# Patient Record
Sex: MALE | Race: WHITE | NOT HISPANIC OR LATINO | Employment: OTHER | URBAN - NONMETROPOLITAN AREA
[De-identification: names, ages, dates, MRNs, and addresses within clinical notes are randomized per-mention and may not be internally consistent; named-entity substitution may affect disease eponyms.]

---

## 2024-01-20 ENCOUNTER — HOSPITAL ENCOUNTER (OUTPATIENT)
Facility: HOSPITAL | Age: 59
Setting detail: OBSERVATION
Discharge: HOME | DRG: 310 | End: 2024-01-21
Attending: EMERGENCY MEDICINE | Admitting: HOSPITALIST
Payer: COMMERCIAL

## 2024-01-20 ENCOUNTER — HOSPITAL ENCOUNTER (OUTPATIENT)
Dept: CARDIOLOGY | Facility: HOSPITAL | Age: 59
Discharge: HOME | End: 2024-01-20
Payer: COMMERCIAL

## 2024-01-20 ENCOUNTER — APPOINTMENT (OUTPATIENT)
Dept: RADIOLOGY | Facility: HOSPITAL | Age: 59
DRG: 310 | End: 2024-01-20
Payer: COMMERCIAL

## 2024-01-20 DIAGNOSIS — I20.0 UNSTABLE ANGINA PECTORIS (MULTI): ICD-10-CM

## 2024-01-20 DIAGNOSIS — I48.91 ATRIAL FIBRILLATION WITH RVR (MULTI): Primary | ICD-10-CM

## 2024-01-20 LAB
ALBUMIN SERPL BCP-MCNC: 4.3 G/DL (ref 3.4–5)
ALP SERPL-CCNC: 69 U/L (ref 33–120)
ALT SERPL W P-5'-P-CCNC: 14 U/L (ref 10–52)
ANION GAP SERPL CALC-SCNC: 11 MMOL/L (ref 10–20)
APTT PPP: 32 SECONDS (ref 27–38)
AST SERPL W P-5'-P-CCNC: 12 U/L (ref 9–39)
BASOPHILS # BLD AUTO: 0.13 X10*3/UL (ref 0–0.1)
BASOPHILS NFR BLD AUTO: 1.2 %
BILIRUB DIRECT SERPL-MCNC: 0.1 MG/DL (ref 0–0.3)
BILIRUB SERPL-MCNC: 0.3 MG/DL (ref 0–1.2)
BNP SERPL-MCNC: 44 PG/ML (ref 0–99)
BUN SERPL-MCNC: 23 MG/DL (ref 6–23)
CALCIUM SERPL-MCNC: 9.5 MG/DL (ref 8.6–10.3)
CARDIAC TROPONIN I PNL SERPL HS: 5 NG/L (ref 0–20)
CHLORIDE SERPL-SCNC: 106 MMOL/L (ref 98–107)
CO2 SERPL-SCNC: 26 MMOL/L (ref 21–32)
CREAT SERPL-MCNC: 0.99 MG/DL (ref 0.5–1.3)
EGFRCR SERPLBLD CKD-EPI 2021: 88 ML/MIN/1.73M*2
EOSINOPHIL # BLD AUTO: 0.37 X10*3/UL (ref 0–0.7)
EOSINOPHIL NFR BLD AUTO: 3.4 %
ERYTHROCYTE [DISTWIDTH] IN BLOOD BY AUTOMATED COUNT: 12.2 % (ref 11.5–14.5)
FLUAV RNA RESP QL NAA+PROBE: NOT DETECTED
FLUBV RNA RESP QL NAA+PROBE: NOT DETECTED
GLUCOSE BLD MANUAL STRIP-MCNC: 111 MG/DL (ref 74–99)
GLUCOSE SERPL-MCNC: 140 MG/DL (ref 74–99)
HCT VFR BLD AUTO: 43.8 % (ref 41–52)
HGB BLD-MCNC: 14.7 G/DL (ref 13.5–17.5)
HOLD SPECIMEN: NORMAL
IMM GRANULOCYTES # BLD AUTO: 0.06 X10*3/UL (ref 0–0.7)
IMM GRANULOCYTES NFR BLD AUTO: 0.5 % (ref 0–0.9)
INR PPP: 1 (ref 0.9–1.1)
LIPASE SERPL-CCNC: 14 U/L (ref 9–82)
LYMPHOCYTES # BLD AUTO: 3.24 X10*3/UL (ref 1.2–4.8)
LYMPHOCYTES NFR BLD AUTO: 29.7 %
MAGNESIUM SERPL-MCNC: 2.28 MG/DL (ref 1.6–2.4)
MCH RBC QN AUTO: 31.4 PG (ref 26–34)
MCHC RBC AUTO-ENTMCNC: 33.6 G/DL (ref 32–36)
MCV RBC AUTO: 94 FL (ref 80–100)
MONOCYTES # BLD AUTO: 0.7 X10*3/UL (ref 0.1–1)
MONOCYTES NFR BLD AUTO: 6.4 %
NEUTROPHILS # BLD AUTO: 6.41 X10*3/UL (ref 1.2–7.7)
NEUTROPHILS NFR BLD AUTO: 58.8 %
NRBC BLD-RTO: 0 /100 WBCS (ref 0–0)
PLATELET # BLD AUTO: 242 X10*3/UL (ref 150–450)
POTASSIUM SERPL-SCNC: 4 MMOL/L (ref 3.5–5.3)
PROT SERPL-MCNC: 6.7 G/DL (ref 6.4–8.2)
PROTHROMBIN TIME: 11.8 SECONDS (ref 9.8–12.8)
RBC # BLD AUTO: 4.68 X10*6/UL (ref 4.5–5.9)
SARS-COV-2 RNA RESP QL NAA+PROBE: NOT DETECTED
SODIUM SERPL-SCNC: 139 MMOL/L (ref 136–145)
WBC # BLD AUTO: 10.9 X10*3/UL (ref 4.4–11.3)

## 2024-01-20 PROCEDURE — 71045 X-RAY EXAM CHEST 1 VIEW: CPT

## 2024-01-20 PROCEDURE — 85730 THROMBOPLASTIN TIME PARTIAL: CPT | Performed by: EMERGENCY MEDICINE

## 2024-01-20 PROCEDURE — 93005 ELECTROCARDIOGRAM TRACING: CPT

## 2024-01-20 PROCEDURE — 93010 ELECTROCARDIOGRAM REPORT: CPT | Performed by: INTERNAL MEDICINE

## 2024-01-20 PROCEDURE — 83690 ASSAY OF LIPASE: CPT | Performed by: EMERGENCY MEDICINE

## 2024-01-20 PROCEDURE — 82248 BILIRUBIN DIRECT: CPT | Performed by: EMERGENCY MEDICINE

## 2024-01-20 PROCEDURE — 2500000005 HC RX 250 GENERAL PHARMACY W/O HCPCS: Performed by: EMERGENCY MEDICINE

## 2024-01-20 PROCEDURE — 2500000004 HC RX 250 GENERAL PHARMACY W/ HCPCS (ALT 636 FOR OP/ED): Performed by: INTERNAL MEDICINE

## 2024-01-20 PROCEDURE — 80048 BASIC METABOLIC PNL TOTAL CA: CPT | Performed by: EMERGENCY MEDICINE

## 2024-01-20 PROCEDURE — 85025 COMPLETE CBC W/AUTO DIFF WBC: CPT | Performed by: EMERGENCY MEDICINE

## 2024-01-20 PROCEDURE — G0378 HOSPITAL OBSERVATION PER HR: HCPCS

## 2024-01-20 PROCEDURE — 84484 ASSAY OF TROPONIN QUANT: CPT | Performed by: EMERGENCY MEDICINE

## 2024-01-20 PROCEDURE — 2500000004 HC RX 250 GENERAL PHARMACY W/ HCPCS (ALT 636 FOR OP/ED): Performed by: EMERGENCY MEDICINE

## 2024-01-20 PROCEDURE — 85610 PROTHROMBIN TIME: CPT | Performed by: EMERGENCY MEDICINE

## 2024-01-20 PROCEDURE — 82947 ASSAY GLUCOSE BLOOD QUANT: CPT

## 2024-01-20 PROCEDURE — 96365 THER/PROPH/DIAG IV INF INIT: CPT

## 2024-01-20 PROCEDURE — 83735 ASSAY OF MAGNESIUM: CPT | Performed by: EMERGENCY MEDICINE

## 2024-01-20 PROCEDURE — 2020000001 HC ICU ROOM DAILY

## 2024-01-20 PROCEDURE — 87636 SARSCOV2 & INF A&B AMP PRB: CPT | Performed by: EMERGENCY MEDICINE

## 2024-01-20 PROCEDURE — 96366 THER/PROPH/DIAG IV INF ADDON: CPT

## 2024-01-20 PROCEDURE — 99291 CRITICAL CARE FIRST HOUR: CPT | Performed by: INTERNAL MEDICINE

## 2024-01-20 PROCEDURE — 99285 EMERGENCY DEPT VISIT HI MDM: CPT | Mod: 25 | Performed by: EMERGENCY MEDICINE

## 2024-01-20 PROCEDURE — 96374 THER/PROPH/DIAG INJ IV PUSH: CPT

## 2024-01-20 PROCEDURE — 36415 COLL VENOUS BLD VENIPUNCTURE: CPT | Performed by: EMERGENCY MEDICINE

## 2024-01-20 PROCEDURE — 71045 X-RAY EXAM CHEST 1 VIEW: CPT | Performed by: RADIOLOGY

## 2024-01-20 PROCEDURE — 83880 ASSAY OF NATRIURETIC PEPTIDE: CPT | Performed by: EMERGENCY MEDICINE

## 2024-01-20 PROCEDURE — 84443 ASSAY THYROID STIM HORMONE: CPT | Performed by: INTERNAL MEDICINE

## 2024-01-20 RX ORDER — ISOSORBIDE MONONITRATE 60 MG/1
60 TABLET, EXTENDED RELEASE ORAL DAILY
COMMUNITY
Start: 2023-02-21 | End: 2024-02-21

## 2024-01-20 RX ORDER — SODIUM CHLORIDE 9 MG/ML
125 INJECTION, SOLUTION INTRAVENOUS CONTINUOUS
Status: DISCONTINUED | OUTPATIENT
Start: 2024-01-20 | End: 2024-01-21

## 2024-01-20 RX ORDER — BUPROPION HYDROCHLORIDE 150 MG/1
150 TABLET ORAL EVERY MORNING
COMMUNITY
Start: 2022-02-01

## 2024-01-20 RX ORDER — METOPROLOL SUCCINATE 50 MG/1
50 TABLET, EXTENDED RELEASE ORAL DAILY
Status: ON HOLD | COMMUNITY
Start: 2022-09-06 | End: 2024-01-21 | Stop reason: SDUPTHER

## 2024-01-20 RX ORDER — DILTIAZEM HYDROCHLORIDE 5 MG/ML
INJECTION INTRAVENOUS
Status: COMPLETED
Start: 2024-01-20 | End: 2024-01-20

## 2024-01-20 RX ORDER — DILTIAZEM HCL/D5W 125 MG/125
15 PLASTIC BAG, INJECTION (ML) INTRAVENOUS CONTINUOUS
Status: DISCONTINUED | OUTPATIENT
Start: 2024-01-20 | End: 2024-01-21 | Stop reason: HOSPADM

## 2024-01-20 RX ORDER — ENALAPRIL MALEATE 5 MG/1
5 TABLET ORAL DAILY
COMMUNITY
Start: 2022-12-22

## 2024-01-20 RX ORDER — DILTIAZEM HYDROCHLORIDE 5 MG/ML
20 INJECTION INTRAVENOUS ONCE
Status: COMPLETED | OUTPATIENT
Start: 2024-01-20 | End: 2024-01-20

## 2024-01-20 RX ORDER — GABAPENTIN 300 MG/1
300 CAPSULE ORAL 3 TIMES DAILY
COMMUNITY
Start: 2022-12-22

## 2024-01-20 RX ORDER — DILTIAZEM HYDROCHLORIDE 5 MG/ML
10 INJECTION INTRAVENOUS ONCE
Status: COMPLETED | OUTPATIENT
Start: 2024-01-20 | End: 2024-01-20

## 2024-01-20 RX ORDER — ADENOSINE 3 MG/ML
6 INJECTION, SOLUTION INTRAVENOUS ONCE
Status: COMPLETED | OUTPATIENT
Start: 2024-01-20 | End: 2024-01-20

## 2024-01-20 RX ORDER — NITROGLYCERIN 0.4 MG/1
0.4 TABLET SUBLINGUAL EVERY 5 MIN PRN
Status: ON HOLD | COMMUNITY
Start: 2023-02-21 | End: 2024-01-21 | Stop reason: SDUPTHER

## 2024-01-20 RX ADMIN — DILTIAZEM HCL 125 MG/125ML IN DEXTROSE 5% IV SOLN (1 MG/ML) 15 MG/HR: 125-5/125 SOLUTION at 17:58

## 2024-01-20 RX ADMIN — DILTIAZEM HYDROCHLORIDE 20 MG: 5 INJECTION INTRAVENOUS at 17:54

## 2024-01-20 RX ADMIN — DILTIAZEM HYDROCHLORIDE 10 MG: 5 INJECTION INTRAVENOUS at 18:01

## 2024-01-20 RX ADMIN — ADENOSINE 6 MG: 3 INJECTION, SOLUTION INTRAVENOUS at 17:49

## 2024-01-20 RX ADMIN — SODIUM CHLORIDE 125 ML/HR: 9 INJECTION, SOLUTION INTRAVENOUS at 17:52

## 2024-01-20 RX ADMIN — Medication 2 L/MIN: at 18:10

## 2024-01-20 SDOH — SOCIAL STABILITY: SOCIAL INSECURITY: HAVE YOU HAD THOUGHTS OF HARMING ANYONE ELSE?: NO

## 2024-01-20 SDOH — SOCIAL STABILITY: SOCIAL INSECURITY: DO YOU FEEL ANYONE HAS EXPLOITED OR TAKEN ADVANTAGE OF YOU FINANCIALLY OR OF YOUR PERSONAL PROPERTY?: NO

## 2024-01-20 SDOH — SOCIAL STABILITY: SOCIAL INSECURITY: DO YOU FEEL UNSAFE GOING BACK TO THE PLACE WHERE YOU ARE LIVING?: NO

## 2024-01-20 SDOH — SOCIAL STABILITY: SOCIAL INSECURITY: ARE YOU OR HAVE YOU BEEN THREATENED OR ABUSED PHYSICALLY, EMOTIONALLY, OR SEXUALLY BY ANYONE?: NO

## 2024-01-20 SDOH — SOCIAL STABILITY: SOCIAL INSECURITY: WERE YOU ABLE TO COMPLETE ALL THE BEHAVIORAL HEALTH SCREENINGS?: YES

## 2024-01-20 SDOH — SOCIAL STABILITY: SOCIAL INSECURITY: ABUSE: ADULT

## 2024-01-20 SDOH — SOCIAL STABILITY: SOCIAL INSECURITY: ARE THERE ANY APPARENT SIGNS OF INJURIES/BEHAVIORS THAT COULD BE RELATED TO ABUSE/NEGLECT?: NO

## 2024-01-20 SDOH — SOCIAL STABILITY: SOCIAL INSECURITY: HAS ANYONE EVER THREATENED TO HURT YOUR FAMILY OR YOUR PETS?: NO

## 2024-01-20 SDOH — SOCIAL STABILITY: SOCIAL INSECURITY: DOES ANYONE TRY TO KEEP YOU FROM HAVING/CONTACTING OTHER FRIENDS OR DOING THINGS OUTSIDE YOUR HOME?: NO

## 2024-01-20 ASSESSMENT — PAIN DESCRIPTION - DESCRIPTORS
DESCRIPTORS: SHARP;PRESSURE
DESCRIPTORS: DISCOMFORT

## 2024-01-20 ASSESSMENT — COGNITIVE AND FUNCTIONAL STATUS - GENERAL
MOBILITY SCORE: 24
DAILY ACTIVITIY SCORE: 24
PATIENT BASELINE BEDBOUND: NO

## 2024-01-20 ASSESSMENT — LIFESTYLE VARIABLES
PRESCIPTION_ABUSE_PAST_12_MONTHS: NO
SUBSTANCE_ABUSE_PAST_12_MONTHS: YES
AUDIT-C TOTAL SCORE: -1
HOW MANY STANDARD DRINKS CONTAINING ALCOHOL DO YOU HAVE ON A TYPICAL DAY: PATIENT DECLINED
HOW OFTEN DO YOU HAVE A DRINK CONTAINING ALCOHOL: PATIENT DECLINED
HOW OFTEN DO YOU HAVE 6 OR MORE DRINKS ON ONE OCCASION: PATIENT DECLINED
SKIP TO QUESTIONS 9-10: 0
AUDIT-C TOTAL SCORE: -1

## 2024-01-20 ASSESSMENT — ENCOUNTER SYMPTOMS
NAUSEA: 0
ABDOMINAL PAIN: 0
MYALGIAS: 0
ARTHRALGIAS: 0
DIZZINESS: 1
EYES NEGATIVE: 1
FEVER: 0
CHILLS: 0
NECK STIFFNESS: 0
FLANK PAIN: 0
LIGHT-HEADEDNESS: 1
PSYCHIATRIC NEGATIVE: 1
NECK PAIN: 0
CHEST TIGHTNESS: 1
PALPITATIONS: 1
VOMITING: 0
HEMATOLOGIC/LYMPHATIC NEGATIVE: 1

## 2024-01-20 ASSESSMENT — ACTIVITIES OF DAILY LIVING (ADL)
BATHING: INDEPENDENT
LACK_OF_TRANSPORTATION: NO
TOILETING: INDEPENDENT
HEARING - LEFT EAR: HEARING AID
WALKS IN HOME: INDEPENDENT
DRESSING YOURSELF: INDEPENDENT
HEARING - RIGHT EAR: HEARING AID
ADEQUATE_TO_COMPLETE_ADL: YES
JUDGMENT_ADEQUATE_SAFELY_COMPLETE_DAILY_ACTIVITIES: YES
LACK_OF_TRANSPORTATION: NO
FEEDING YOURSELF: INDEPENDENT
PATIENT'S MEMORY ADEQUATE TO SAFELY COMPLETE DAILY ACTIVITIES?: YES
GROOMING: INDEPENDENT

## 2024-01-20 ASSESSMENT — PATIENT HEALTH QUESTIONNAIRE - PHQ9
1. LITTLE INTEREST OR PLEASURE IN DOING THINGS: NOT AT ALL
SUM OF ALL RESPONSES TO PHQ9 QUESTIONS 1 & 2: 0
2. FEELING DOWN, DEPRESSED OR HOPELESS: NOT AT ALL

## 2024-01-20 ASSESSMENT — PAIN DESCRIPTION - ORIENTATION: ORIENTATION: MID

## 2024-01-20 ASSESSMENT — PAIN DESCRIPTION - LOCATION: LOCATION: CHEST

## 2024-01-20 ASSESSMENT — COLUMBIA-SUICIDE SEVERITY RATING SCALE - C-SSRS
6. HAVE YOU EVER DONE ANYTHING, STARTED TO DO ANYTHING, OR PREPARED TO DO ANYTHING TO END YOUR LIFE?: NO
2. HAVE YOU ACTUALLY HAD ANY THOUGHTS OF KILLING YOURSELF?: NO
1. IN THE PAST MONTH, HAVE YOU WISHED YOU WERE DEAD OR WISHED YOU COULD GO TO SLEEP AND NOT WAKE UP?: NO

## 2024-01-20 ASSESSMENT — PAIN SCALES - GENERAL
PAINLEVEL_OUTOF10: 9
PAINLEVEL_OUTOF10: 3

## 2024-01-20 ASSESSMENT — PAIN - FUNCTIONAL ASSESSMENT
PAIN_FUNCTIONAL_ASSESSMENT: 0-10
PAIN_FUNCTIONAL_ASSESSMENT: 0-10

## 2024-01-20 ASSESSMENT — PAIN DESCRIPTION - PAIN TYPE: TYPE: ACUTE PAIN

## 2024-01-20 NOTE — ED PROVIDER NOTES
Chief Complaint: CHEST PAIN AND PALPITATIONS     This is a 58-year-old male who is here from out of town visiting.  He developed chest pain and palpitations just prior to arrival and was brought here to the emergency department by his wife and friend.  He has had history of 5 vessel bypass surgery at [facility] in the past has a history of atrial fibrillation as well as insulin-dependent diabetes.  He complains of some chest discomfort as well as palpitations he denies any nausea vomiting he feels faint also at this time.           Review of Systems   Constitutional:  Negative for chills and fever.   HENT: Negative.     Eyes: Negative.    Respiratory:  Positive for chest tightness.    Cardiovascular:  Positive for chest pain and palpitations. Negative for leg swelling.   Gastrointestinal:  Negative for abdominal pain, nausea and vomiting.   Genitourinary:  Negative for flank pain.   Musculoskeletal:  Negative for arthralgias, myalgias, neck pain and neck stiffness.   Skin:  Negative for rash.   Neurological:  Positive for dizziness and light-headedness. Negative for syncope.   Hematological: Negative.    Psychiatric/Behavioral: Negative.          Physical Exam  Constitutional:       Appearance: He is well-developed. He is obese. He is ill-appearing. He is not diaphoretic.      Comments: She presents very talkative with wide-complex tachycardia with hypertension   HENT:      Head: Normocephalic and atraumatic.   Eyes:      Pupils: Pupils are equal, round, and reactive to light.   Cardiovascular:      Rate and Rhythm: Tachycardia present.      Heart sounds: Normal heart sounds.   Pulmonary:      Effort: No tachypnea, accessory muscle usage or respiratory distress.      Breath sounds: Decreased breath sounds present.   Chest:      Chest wall: No mass or deformity.   Abdominal:      Palpations: Abdomen is soft. There is no fluid wave.      Tenderness: There is no abdominal tenderness.   Musculoskeletal:       Cervical back: Normal range of motion and neck supple.      Left lower leg: No edema.   Skin:     General: Skin is warm and dry.      Comments: Patient somewhat pale in color   Neurological:      General: No focal deficit present.      Mental Status: He is alert and oriented to person, place, and time.          Labs Reviewed   CBC WITH AUTO DIFFERENTIAL - Abnormal       Result Value    WBC 10.9      nRBC 0.0      RBC 4.68      Hemoglobin 14.7      Hematocrit 43.8      MCV 94      MCH 31.4      MCHC 33.6      RDW 12.2      Platelets 242      Neutrophils % 58.8      Immature Granulocytes %, Automated 0.5      Lymphocytes % 29.7      Monocytes % 6.4      Eosinophils % 3.4      Basophils % 1.2      Neutrophils Absolute 6.41      Immature Granulocytes Absolute, Automated 0.06      Lymphocytes Absolute 3.24      Monocytes Absolute 0.70      Eosinophils Absolute 0.37      Basophils Absolute 0.13 (*)    BASIC METABOLIC PANEL - Abnormal    Glucose 140 (*)     Sodium 139      Potassium 4.0      Chloride 106      Bicarbonate 26      Anion Gap 11      Urea Nitrogen 23      Creatinine 0.99      eGFR 88      Calcium 9.5     MAGNESIUM - Normal    Magnesium 2.28     LIPASE - Normal    Lipase 14      Narrative:     Venipuncture immediately after or during the administration of Metamizole may lead to falsely low results. Testing should be performed immediately prior to Metamizole dosing.   HEPATIC FUNCTION PANEL - Normal    Albumin 4.3      Bilirubin, Total 0.3      Bilirubin, Direct 0.1      Alkaline Phosphatase 69      ALT 14      AST 12      Total Protein 6.7     PROTIME-INR - Normal    Protime 11.8      INR 1.0     APTT - Normal    aPTT 32      Narrative:     The APTT is no longer used for monitoring Unfractionated Heparin Therapy. For monitoring Heparin Therapy, use the Heparin Assay.   B-TYPE NATRIURETIC PEPTIDE - Normal    BNP 44      Narrative:        <100 pg/mL - Heart failure unlikely  100-299 pg/mL - Intermediate  probability of acute heart                  failure exacerbation. Correlate with clinical                  context and patient history.    >=300 pg/mL - Heart Failure likely. Correlate with clinical                  context and patient history.    BNP testing is performed using different testing methodology at [facility] than at other [facility]. Direct result comparisons should only be made within the same method.      TROPONIN I, HIGH SENSITIVITY - Normal    Troponin I, High Sensitivity 5      Narrative:     Less than 99th percentile of normal range cutoff-  Female and children under 18 years old <14 ng/L; Male <21 ng/L: Negative  Repeat testing should be performed if clinically indicated.     Female and children under 18 years old 14-50 ng/L; Male 21-50 ng/L:  Consistent with possible cardiac damage and possible increased clinical   risk. Serial measurements may help to assess extent of myocardial damage.     >50 ng/L: Consistent with cardiac damage, increased clinical risk and  myocardial infarction. Serial measurements may help assess extent of   myocardial damage.      NOTE: Children less than 1 year old may have higher baseline troponin   levels and results should be interpreted in conjunction with the overall   clinical context.     NOTE: Troponin I testing is performed using a different   testing methodology at [facility] than at other   [facility]. Direct result comparisons should only   be made within the same method.   SARS-COV-2 PCR, SYMPTOMATIC - Normal    Coronavirus 2019, PCR Not Detected      Narrative:     This assay has received FDA Emergency Use Authorization (EUA) and is only authorized for the duration of time that circumstances exist to justify the authorization of the emergency use of in vitro diagnostic tests for the detection of SARS-CoV-2 virus and/or diagnosis of COVID-19 infection under section 564(b)(1) of the Act, 21 U.S.C. 360bbb-3(b)(1). This  assay is an in vitro diagnostic nucleic acid amplification test for the qualitative detection of SARS-CoV-2 from nasopharyngeal specimens and has been validated for use at [facility]. Negative results do not preclude COVID-19 infections and should not be used as the sole basis for diagnosis, treatment, or other management decisions.     INFLUENZA A AND B PCR   GRAY TOP        XR chest 1 view   Final Result   Hypoventilatory changes, no acute cardiopulmonary process.        MACRO:   None        Signed by: Nuria Yoder 1/20/2024 6:36 PM   Dictation workstation:   [id number]           Critical Care    Performed by: Jj Villalobos MD  Authorized by: Jj Villalobos MD    Critical care provider statement:     Critical care time was exclusive of:  Separately billable procedures and treating other patients    Critical care was necessary to treat or prevent imminent or life-threatening deterioration of the following conditions: Cardiac arrhythmia.    Critical care was time spent personally by me on the following activities:  Development of treatment plan with patient or surrogate, discussions with consultants, discussions with primary provider, examination of patient, ordering and performing treatments and interventions, ordering and review of laboratory studies, ordering and review of radiographic studies, pulse oximetry, re-evaluation of patient's condition and review of old charts       Medical Decision Making  Differential diagnose include SVT V. tach A-fib with RVR a flutter acute MI.  Patient had a tachycardia he has a known right bundle branch block and the tachycardia was fairly regular he was given Identicard 6 mg intravenous push after IV was established she is placed on cardiac monitor and placed on oxygen by 2 L nasal cannula.  This slowed his heart rate down to see an underlying what appeared to be in a flutter pattern.  He subsequently received 20 mg of Cardizem and was started on a  Cardizem drip at 15 mg/h and received 1 additional 10 mg Cardizem bolus.  Gradually his heart rate came down to approximately  he has some transient hypotension with a heart blood pressure systolic of 88 but then after his heart rate stabilizes blood pressure was up to 114Th.  A complete cardiac workup chest x-ray EKG BMP were ordered at this time patient took aspirin and 3 nitroglycerin prior to arrival here in the emergency department e pain had went from a level 10 down to very trace almost minimal at this time.  COVID testing was negative lipase was normal liver function test was normal metabolic panel was normal magnesium was 2.28 INR was normal.  White blood count was 10,000 with a normal hemoglobin hematocrit.  X-ray shows hyperventilation.  Patient's troponin was negative he subsequently converted into a sinus rhythm at 82-84 his Cardizem was titrated down to 12.5 mg/h will be continued to be titrated patient will be admitted intensive care unit at this time with chest pain and A-fib with RVR.  Critical care time=37 minutes total exclusive of procedures    Amount and/or Complexity of Data Reviewed  ECG/medicine tests: independent interpretation performed.     Details: Lead EKG #1 showed sinus tachycardia at 172/min with a wide-complex QRS with a right bundle branch block.  And nonspecific ST-T changes as interpreted by myself the emergency physician    Twelve-lead EKG #2 shows an A-fib at 96/min with a right bundle branch block pattern with some nonspecific inferior ST segment elevation depressions as interpreted by myself the emergency   physician    EKG #3 showed normal sinus rhythm right bundle branch block pattern at 85/min and some nonspecific ST-T changes as interpreted by myself the emergency physician  Discussion of management or test interpretation with external provider(s): Critical care time total =37 minutes         Diagnoses as of 01/20/24 1905   Atrial fibrillation with RVR (CMS/[facility])    Unstable angina pectoris (CMS/[facility])                    Jj Villalobos MD  01/20/24 1905       Jj Villalobos MD  02/14/24 0719

## 2024-01-21 VITALS
SYSTOLIC BLOOD PRESSURE: 115 MMHG | HEIGHT: 68 IN | OXYGEN SATURATION: 96 % | WEIGHT: 224.21 LBS | RESPIRATION RATE: 20 BRPM | BODY MASS INDEX: 33.98 KG/M2 | TEMPERATURE: 96.8 F | DIASTOLIC BLOOD PRESSURE: 71 MMHG | HEART RATE: 66 BPM

## 2024-01-21 PROBLEM — I48.91 ATRIAL FIBRILLATION WITH RVR (MULTI): Status: RESOLVED | Noted: 2024-01-20 | Resolved: 2024-01-21

## 2024-01-21 LAB
CARDIAC TROPONIN I PNL SERPL HS: 7 NG/L (ref 0–20)
GLUCOSE BLD MANUAL STRIP-MCNC: 209 MG/DL (ref 74–99)
GLUCOSE BLD MANUAL STRIP-MCNC: 239 MG/DL (ref 74–99)
HOLD SPECIMEN: NORMAL
HOLD SPECIMEN: NORMAL
TSH SERPL-ACNC: 3.66 MIU/L (ref 0.44–3.98)

## 2024-01-21 PROCEDURE — 99239 HOSP IP/OBS DSCHRG MGMT >30: CPT | Performed by: HOSPITALIST

## 2024-01-21 PROCEDURE — 82947 ASSAY GLUCOSE BLOOD QUANT: CPT | Mod: 59

## 2024-01-21 PROCEDURE — 94761 N-INVAS EAR/PLS OXIMETRY MLT: CPT

## 2024-01-21 PROCEDURE — 2500000001 HC RX 250 WO HCPCS SELF ADMINISTERED DRUGS (ALT 637 FOR MEDICARE OP): Performed by: INTERNAL MEDICINE

## 2024-01-21 PROCEDURE — G0378 HOSPITAL OBSERVATION PER HR: HCPCS

## 2024-01-21 PROCEDURE — 2500000002 HC RX 250 W HCPCS SELF ADMINISTERED DRUGS (ALT 637 FOR MEDICARE OP, ALT 636 FOR OP/ED): Performed by: INTERNAL MEDICINE

## 2024-01-21 PROCEDURE — 84484 ASSAY OF TROPONIN QUANT: CPT | Performed by: HOSPITALIST

## 2024-01-21 PROCEDURE — 36415 COLL VENOUS BLD VENIPUNCTURE: CPT | Performed by: HOSPITALIST

## 2024-01-21 PROCEDURE — 96374 THER/PROPH/DIAG INJ IV PUSH: CPT | Mod: 59

## 2024-01-21 PROCEDURE — 2500000004 HC RX 250 GENERAL PHARMACY W/ HCPCS (ALT 636 FOR OP/ED): Performed by: INTERNAL MEDICINE

## 2024-01-21 RX ORDER — DEXTROSE 50 % IN WATER (D50W) INTRAVENOUS SYRINGE
25
Status: DISCONTINUED | OUTPATIENT
Start: 2024-01-21 | End: 2024-01-21 | Stop reason: HOSPADM

## 2024-01-21 RX ORDER — ACETAMINOPHEN 160 MG/5ML
650 SOLUTION ORAL EVERY 4 HOURS PRN
Status: DISCONTINUED | OUTPATIENT
Start: 2024-01-21 | End: 2024-01-21 | Stop reason: HOSPADM

## 2024-01-21 RX ORDER — ASPIRIN 81 MG/1
81 TABLET ORAL DAILY
Status: DISCONTINUED | OUTPATIENT
Start: 2024-01-21 | End: 2024-01-21 | Stop reason: HOSPADM

## 2024-01-21 RX ORDER — GABAPENTIN 300 MG/1
300 CAPSULE ORAL EVERY 8 HOURS SCHEDULED
Status: DISCONTINUED | OUTPATIENT
Start: 2024-01-21 | End: 2024-01-21 | Stop reason: HOSPADM

## 2024-01-21 RX ORDER — DEXTROSE MONOHYDRATE 100 MG/ML
0.3 INJECTION, SOLUTION INTRAVENOUS ONCE AS NEEDED
Status: DISCONTINUED | OUTPATIENT
Start: 2024-01-21 | End: 2024-01-21 | Stop reason: HOSPADM

## 2024-01-21 RX ORDER — BUPROPION HYDROCHLORIDE 150 MG/1
150 TABLET ORAL EVERY MORNING
Status: DISCONTINUED | OUTPATIENT
Start: 2024-01-21 | End: 2024-01-21 | Stop reason: HOSPADM

## 2024-01-21 RX ORDER — ONDANSETRON HYDROCHLORIDE 2 MG/ML
4 INJECTION, SOLUTION INTRAVENOUS EVERY 8 HOURS PRN
Status: DISCONTINUED | OUTPATIENT
Start: 2024-01-21 | End: 2024-01-21 | Stop reason: HOSPADM

## 2024-01-21 RX ORDER — ASPIRIN 81 MG/1
81 TABLET ORAL DAILY
COMMUNITY

## 2024-01-21 RX ORDER — NITROGLYCERIN 0.4 MG/1
0.4 TABLET SUBLINGUAL EVERY 5 MIN PRN
Status: DISCONTINUED | OUTPATIENT
Start: 2024-01-21 | End: 2024-01-21 | Stop reason: HOSPADM

## 2024-01-21 RX ORDER — ISOSORBIDE MONONITRATE 60 MG/1
60 TABLET, EXTENDED RELEASE ORAL DAILY
Status: DISCONTINUED | OUTPATIENT
Start: 2024-01-21 | End: 2024-01-21 | Stop reason: HOSPADM

## 2024-01-21 RX ORDER — METOPROLOL TARTRATE 50 MG/1
50 TABLET ORAL 2 TIMES DAILY
Status: DISCONTINUED | OUTPATIENT
Start: 2024-01-21 | End: 2024-01-21 | Stop reason: HOSPADM

## 2024-01-21 RX ORDER — ACETAMINOPHEN 650 MG/1
650 SUPPOSITORY RECTAL EVERY 4 HOURS PRN
Status: DISCONTINUED | OUTPATIENT
Start: 2024-01-21 | End: 2024-01-21 | Stop reason: HOSPADM

## 2024-01-21 RX ORDER — METOPROLOL SUCCINATE 50 MG/1
50 TABLET, EXTENDED RELEASE ORAL DAILY
Qty: 30 TABLET | Refills: 0 | Status: SHIPPED | OUTPATIENT
Start: 2024-01-21 | End: 2024-02-20

## 2024-01-21 RX ORDER — NITROGLYCERIN 0.4 MG/1
0.4 TABLET SUBLINGUAL EVERY 5 MIN PRN
Qty: 90 TABLET | Refills: 0 | Status: SHIPPED | OUTPATIENT
Start: 2024-01-21 | End: 2024-02-20

## 2024-01-21 RX ORDER — MAGNESIUM HYDROXIDE 2400 MG/10ML
10 SUSPENSION ORAL DAILY PRN
Status: DISCONTINUED | OUTPATIENT
Start: 2024-01-21 | End: 2024-01-21 | Stop reason: HOSPADM

## 2024-01-21 RX ORDER — MORPHINE SULFATE 2 MG/ML
2 INJECTION, SOLUTION INTRAMUSCULAR; INTRAVENOUS
Status: DISCONTINUED | OUTPATIENT
Start: 2024-01-21 | End: 2024-01-21 | Stop reason: HOSPADM

## 2024-01-21 RX ORDER — INSULIN GLARGINE 100 [IU]/ML
40 INJECTION, SOLUTION SUBCUTANEOUS
Status: DISCONTINUED | OUTPATIENT
Start: 2024-01-21 | End: 2024-01-21 | Stop reason: HOSPADM

## 2024-01-21 RX ORDER — ACETAMINOPHEN 325 MG/1
650 TABLET ORAL EVERY 4 HOURS PRN
Status: DISCONTINUED | OUTPATIENT
Start: 2024-01-21 | End: 2024-01-21 | Stop reason: HOSPADM

## 2024-01-21 RX ORDER — ENALAPRIL MALEATE 5 MG/1
5 TABLET ORAL DAILY
Status: DISCONTINUED | OUTPATIENT
Start: 2024-01-21 | End: 2024-01-21 | Stop reason: HOSPADM

## 2024-01-21 RX ORDER — ONDANSETRON 4 MG/1
4 TABLET, ORALLY DISINTEGRATING ORAL EVERY 8 HOURS PRN
Status: DISCONTINUED | OUTPATIENT
Start: 2024-01-21 | End: 2024-01-21 | Stop reason: HOSPADM

## 2024-01-21 RX ADMIN — METOPROLOL TARTRATE 50 MG: 50 TABLET, FILM COATED ORAL at 08:43

## 2024-01-21 RX ADMIN — MORPHINE SULFATE 2 MG: 2 INJECTION, SOLUTION INTRAMUSCULAR; INTRAVENOUS at 03:16

## 2024-01-21 RX ADMIN — INSULIN GLARGINE 40 UNITS: 100 INJECTION, SOLUTION SUBCUTANEOUS at 09:06

## 2024-01-21 RX ADMIN — BUPROPION HYDROCHLORIDE 150 MG: 150 TABLET, FILM COATED, EXTENDED RELEASE ORAL at 09:09

## 2024-01-21 RX ADMIN — ENALAPRIL MALEATE 5 MG: 5 TABLET ORAL at 08:43

## 2024-01-21 RX ADMIN — INSULIN LISPRO 8.55 UNITS: 100 INJECTION, SOLUTION INTRAVENOUS; SUBCUTANEOUS at 09:05

## 2024-01-21 RX ADMIN — ACETAMINOPHEN 650 MG: 325 TABLET ORAL at 01:02

## 2024-01-21 RX ADMIN — NITROGLYCERIN 0.4 MG: 0.4 TABLET SUBLINGUAL at 00:53

## 2024-01-21 RX ADMIN — ISOSORBIDE MONONITRATE 60 MG: 60 TABLET, EXTENDED RELEASE ORAL at 08:43

## 2024-01-21 RX ADMIN — METOPROLOL TARTRATE 50 MG: 50 TABLET, FILM COATED ORAL at 01:18

## 2024-01-21 ASSESSMENT — PAIN DESCRIPTION - DESCRIPTORS
DESCRIPTORS: DISCOMFORT
DESCRIPTORS: DISCOMFORT

## 2024-01-21 ASSESSMENT — PAIN - FUNCTIONAL ASSESSMENT
PAIN_FUNCTIONAL_ASSESSMENT: 0-10

## 2024-01-21 ASSESSMENT — PAIN SCALES - GENERAL
PAINLEVEL_OUTOF10: 0 - NO PAIN
PAINLEVEL_OUTOF10: 0 - NO PAIN
PAINLEVEL_OUTOF10: 7
PAINLEVEL_OUTOF10: 5 - MODERATE PAIN
PAINLEVEL_OUTOF10: 5 - MODERATE PAIN

## 2024-01-21 NOTE — CARE PLAN
Problem: Pain  Goal: My pain/discomfort is manageable  Outcome: Progressing     Problem: Safety  Goal: Patient will be injury free during hospitalization  Outcome: Met  Goal: I will remain free of falls  Outcome: Met     Problem: Daily Care  Goal: Daily care needs are met  Outcome: Met     Problem: Arrythmia/Dysrhythmia  Goal: Lab values return to normal range  Outcome: Met     Problem: Psychosocial Needs  Goal: Collaborate with me, my family, and caregiver to identify my specific goals  Recent Flowsheet Documentation  Taken 1/20/2024 2046 by Esmer Rodriguez RN  Cultural Requests During Hospitalization: none  Spiritual Requests During Hospitalization: none   The patient's goals for the shift include      The clinical goals for the shift include hemodynamic stability

## 2024-01-21 NOTE — H&P
History Of Present Illness  Jefe Quinonez is a 58 y.o. male  Who presented to the emergency room for palpitations and chest pain.  On presentation, blood pressure 112/69, respiratory rate 16, heart rate 104 irregular, afebrile, saturation oxygen 96% on room air.  Blood workup came back grossly within normal limits including troponin level.  EKG showed atrial fibrillation with rapid ventricular rate.  Patient was given in the emergency room 6 mg IV adenosine, 20 mg IV Cardizem followed by 10 mg and then a Cardizem drip and IV fluids and then admitted to the medical service for further investigation and management.  Upon encounter now, patient reports that he is still having the chest pain.  It is throbbing aching constant.  It is chronic.  Not related to activity.  Nonpleuritic.  Not cough related.  No fever or chills.  It has been associated with shortness of breath.  And has been having palpitations.     ROS  10 systems were reviewed and were negative except for those noted in the history of present illness.    Past Medical History  Past Medical History:   Diagnosis Date    Coronary artery disease     Diabetes mellitus (CMS/HCC)     Hypertension      Pertinent medical history also documented in my below narrative    Surgical History  Past Surgical History:   Procedure Laterality Date    CARDIAC SURGERY      CHOLECYSTECTOMY        Pertinent surgical history also documented in my below narrative    Social History  He reports that he quit smoking about 3 months ago. His smoking use included cigarettes. He has a 20.00 pack-year smoking history. He has never used smokeless tobacco.  Drug: Marijuana. He reports that he does not drink alcohol.    Family History  No family history on file.     Allergies  Bee pollen and Penicillins    Medications Prior to Admission   Medication Sig Dispense Refill Last Dose    buPROPion XL (Wellbutrin XL) 150 mg 24 hr tablet Take 1 tablet (150 mg) by mouth once daily in the morning.        "enalapril (Vasotec) 5 mg tablet Take 1 tablet (5 mg) by mouth once daily.       gabapentin (Neurontin) 300 mg capsule Take 1 capsule (300 mg) by mouth 3 times a day.       isosorbide mononitrate ER (Imdur) 60 mg 24 hr tablet Take 1 tablet (60 mg) by mouth once daily.       metoprolol succinate XL (Toprol-XL) 50 mg 24 hr tablet Take 1 tablet (50 mg) by mouth once daily.       nitroglycerin (Nitrostat) 0.4 mg SL tablet Place 1 tablet (0.4 mg) under the tongue every 5 minutes if needed.       vortioxetine (Trintellix) 20 mg tablet tablet Take 1 tablet (20 mg) by mouth once daily.           Last Recorded Vitals  Blood pressure 105/70, pulse 83, temperature 36.4 °C (97.5 °F), resp. rate 20, height 1.727 m (5' 8\"), weight 102 kg (224 lb 3.3 oz), SpO2 99 %.    Physical Exam  Constitutional:       General: He is not in acute distress.     Appearance: He is obese. He is not ill-appearing.      Comments: Awake alert and oriented x3   HENT:      Mouth/Throat:      Pharynx: Oropharynx is clear.   Eyes:      Pupils: Pupils are equal, round, and reactive to light.   Cardiovascular:      Rate and Rhythm: Normal rate and regular rhythm.      Heart sounds: Normal heart sounds.   Pulmonary:      Effort: No respiratory distress.      Breath sounds: Normal breath sounds. No wheezing or rhonchi.   Abdominal:      General: Abdomen is flat. Bowel sounds are normal. There is no distension.      Palpations: Abdomen is soft.      Tenderness: There is no abdominal tenderness.   Musculoskeletal:         General: No swelling.   Skin:     General: Skin is warm.   Neurological:      General: No focal deficit present.   Psychiatric:         Mood and Affect: Mood normal.         Behavior: Behavior normal.         Thought Content: Thought content normal.         Judgment: Judgment normal.           Relevant Results  Results for orders placed or performed during the hospital encounter of 01/20/24 (from the past 24 hour(s))   SARS-CoV-2 RT PCR "   Result Value Ref Range    Coronavirus 2019, PCR Not Detected Not Detected   Influenza A, and B PCR   Result Value Ref Range    Flu A Result Not Detected Not Detected    Flu B Result Not Detected Not Detected   CBC and Auto Differential   Result Value Ref Range    WBC 10.9 4.4 - 11.3 x10*3/uL    nRBC 0.0 0.0 - 0.0 /100 WBCs    RBC 4.68 4.50 - 5.90 x10*6/uL    Hemoglobin 14.7 13.5 - 17.5 g/dL    Hematocrit 43.8 41.0 - 52.0 %    MCV 94 80 - 100 fL    MCH 31.4 26.0 - 34.0 pg    MCHC 33.6 32.0 - 36.0 g/dL    RDW 12.2 11.5 - 14.5 %    Platelets 242 150 - 450 x10*3/uL    Neutrophils % 58.8 40.0 - 80.0 %    Immature Granulocytes %, Automated 0.5 0.0 - 0.9 %    Lymphocytes % 29.7 13.0 - 44.0 %    Monocytes % 6.4 2.0 - 10.0 %    Eosinophils % 3.4 0.0 - 6.0 %    Basophils % 1.2 0.0 - 2.0 %    Neutrophils Absolute 6.41 1.20 - 7.70 x10*3/uL    Immature Granulocytes Absolute, Automated 0.06 0.00 - 0.70 x10*3/uL    Lymphocytes Absolute 3.24 1.20 - 4.80 x10*3/uL    Monocytes Absolute 0.70 0.10 - 1.00 x10*3/uL    Eosinophils Absolute 0.37 0.00 - 0.70 x10*3/uL    Basophils Absolute 0.13 (H) 0.00 - 0.10 x10*3/uL   Basic metabolic panel   Result Value Ref Range    Glucose 140 (H) 74 - 99 mg/dL    Sodium 139 136 - 145 mmol/L    Potassium 4.0 3.5 - 5.3 mmol/L    Chloride 106 98 - 107 mmol/L    Bicarbonate 26 21 - 32 mmol/L    Anion Gap 11 10 - 20 mmol/L    Urea Nitrogen 23 6 - 23 mg/dL    Creatinine 0.99 0.50 - 1.30 mg/dL    eGFR 88 >60 mL/min/1.73m*2    Calcium 9.5 8.6 - 10.3 mg/dL   Magnesium   Result Value Ref Range    Magnesium 2.28 1.60 - 2.40 mg/dL   Lipase   Result Value Ref Range    Lipase 14 9 - 82 U/L   Hepatic function panel   Result Value Ref Range    Albumin 4.3 3.4 - 5.0 g/dL    Bilirubin, Total 0.3 0.0 - 1.2 mg/dL    Bilirubin, Direct 0.1 0.0 - 0.3 mg/dL    Alkaline Phosphatase 69 33 - 120 U/L    ALT 14 10 - 52 U/L    AST 12 9 - 39 U/L    Total Protein 6.7 6.4 - 8.2 g/dL   Protime-INR   Result Value Ref Range    Protime  11.8 9.8 - 12.8 seconds    INR 1.0 0.9 - 1.1   APTT   Result Value Ref Range    aPTT 32 27 - 38 seconds   B-Type Natriuretic Peptide   Result Value Ref Range    BNP 44 0 - 99 pg/mL   Troponin I, High Sensitivity   Result Value Ref Range    Troponin I, High Sensitivity 5 0 - 20 ng/L   Lavender Top   Result Value Ref Range    Extra Tube Hold for add-ons.    Gray Top   Result Value Ref Range    Extra Tube Hold for add-ons.    SST TOP   Result Value Ref Range    Extra Tube Hold for add-ons.    POCT GLUCOSE   Result Value Ref Range    POCT Glucose 111 (H) 74 - 99 mg/dL        XR chest 1 view    Result Date: 1/20/2024  Interpreted By:  Nuria Yoder, STUDY: XR CHEST 1 VIEW;  1/20/2024 6:32 pm   INDICATION: Signs/Symptoms:CP.   COMPARISON: None.   ACCESSION NUMBER(S): [id number]   ORDERING CLINICIAN: RACHEL LAMB   FINDINGS: Midline sternotomy wires and mediastinal clips noted. Overlying leads are present.   CARDIOMEDIASTINAL SILHOUETTE: Cardiac silhouette is within the upper limits of normal. Atherosclerotic calcification of the aorta.   LUNGS: Note is made of low lung volumes with bronchovascular crowding. No consolidation, pleural effusion or pneumothorax.   ABDOMEN: No remarkable upper abdominal findings.   BONES: No acute osseous abnormality.       Hypoventilatory changes, no acute cardiopulmonary process.   MACRO: None   Signed by: Nuria Yoder 1/20/2024 6:36 PM Dictation workstation:   [id number]        Assessment/Plan     58-year-old obese male with a past medical history of coronary artery disease status post CABG, chronic angina, atrial fibrillation, insulin-dependent diabetes, diabetic neuropathy and depression/anxiety who presented to the emergency room for palpitations and chest pain.  Patient was found to have atrial fibrillation with rapid ventricular rate.    Admit patient to the intensive care unit.  Telemetry and vital signs monitoring.  Continue with the Cardizem drip.  Patient at home was taking  Toprol-XL 50 mg once a day.  I will replace it by metoprolol tartrate 50 mg twice a day while inpatient.  Consult cardiology.  And order echocardiogram.  Check TSH level.  Resume the rest of home medications.  I asked the patient why he is not on anticoagulation therapy like Xarelto or Eliquis and he did not give me a straight answer to that.  He just told me that he has been having this heart problem and nothing has been helping.  At home, patient is on NovoLog Mix.  Here at the hospital, I will place him on Lantus 40 units daily along with high-dose insulin sliding scale  SCDs for DVT prophylaxis  Full code      (This note was generated with voice recognition software and may contain errors including spelling, grammar, syntax and misrecognition of what was dictated, that are not fully corrected)     Sonu Cortez MD

## 2024-01-21 NOTE — DISCHARGE SUMMARY
Discharge Diagnosis  Atrial fibrillation with RVR (CMS/[facility]) RVR with rate control  Coronary artery disease, s/pCABG  Insulin-dependent type 2 diabetes  Depression anxiety  Diabetic neuropathy  Issues Requiring Follow-Up  Chronic atrial fibrillation    Discharge Meds     Your medication list        CONTINUE taking these medications        Instructions Last Dose Given Next Dose Due   aspirin 81 mg EC tablet           buPROPion  mg 24 hr tablet  Commonly known as: Wellbutrin XL           enalapril 5 mg tablet  Commonly known as: Vasotec           gabapentin 300 mg capsule  Commonly known as: Neurontin           insulin NPH and regular human 100 unit/mL (70-30) injection  Commonly known as: HumuLIN 70-30, NovoLIN 70-30           isosorbide mononitrate ER 60 mg 24 hr tablet  Commonly known as: Imdur           metoprolol succinate XL 50 mg 24 hr tablet  Commonly known as: Toprol-XL           nitroglycerin 0.4 mg SL tablet  Commonly known as: Nitrostat           vortioxetine 20 mg tablet tablet  Commonly known as: Trintellix                    Test Results Pending At Discharge  Pending Labs       No current pending labs.            Hospital Course  58 y.o. male  Who presented to the emergency room for palpitations and chest pain.  On presentation, blood pressure 112/69, respiratory rate 16, heart rate 104 irregular, afebrile, saturation oxygen 96% on room air.  Blood workup came back grossly within normal limits including troponin level.  EKG showed atrial fibrillation with rapid ventricular rate.  Patient was given in the emergency room 6 mg IV adenosine, 20 mg IV Cardizem followed by 10 mg and then a Cardizem drip and IV fluids and then admitted to the medical service for further investigation and management.  Upon encounter now, patient reports that he is still having the chest pain.  It is throbbing aching constant.  It is chronic.  Not related to activity.  Nonpleuritic.  Not cough related.  No fever or chills.   It has been associated with shortness of breath.  And has been having palpitations.  During hospitalization his rate improved with Cardizem drip and metoprolol.  Currently rate in 60s.  Resumed his home metoprolol dose.  Patient is asymptomatic.  No chest pain.  Troponins negative.  Patient and his wife wishes to return back to his primary cardiologist at [facility].  Advised to continue blood pressure check and heart rate check 4 times a day, document intake to the cardiologist.  Also advised to take extra pill if the heart rate is above 120 and symptomatic.  Patient is traveling back to home today, 1 hour drive.  Will follow-up with cardiology next week.  Both patient and wife verbalized understanding about discharge instructions and follow-up.    Pertinent Physical Exam At Time of Discharge  Physical Exam  Gen: NAD, A&O x 3, Well developed  HEENT: Normal size, shape; no masses noted in neck, no lymphadenopathy, no tracheal deviation, non-palpable thyroid  Neck: Supple.Trachea central.No neck rigidity.  Chest: chest symmetry with respirations, no wheezes, crackles  CVS: Irregularly irregular, rate control, no murmur, gallop, no rubs  Abd: soft, NT/ND, +BS  Ext: no Clubbing/Cyanosis/edema, non-tender.Pulse 2+. Homans Negative.  Skin: Dry, warm, good condition  Neuro: grossly normal  Psy: Mood and affect appropriate   Outpatient Follow-Up  Follow-up with primary cardiology at [facility] in 1 week.  Follow-up with PCP in 1 week.  Total discharge time 35 minutes.        Gael Saucedo MD

## 2024-01-22 ENCOUNTER — HOSPITAL ENCOUNTER (OUTPATIENT)
Dept: CARDIOLOGY | Facility: HOSPITAL | Age: 59
Discharge: HOME | End: 2024-01-22
Payer: COMMERCIAL

## 2024-01-22 PROCEDURE — 93005 ELECTROCARDIOGRAM TRACING: CPT

## 2024-01-27 LAB
ATRIAL RATE: 102 BPM
Q ONSET: 195 MS
Q ONSET: 196 MS
QRS COUNT: 16 BEATS
QRS COUNT: 28 BEATS
QRS DURATION: 130 MS
QRS DURATION: 140 MS
QT INTERVAL: 320 MS
QT INTERVAL: 362 MS
QTC CALCULATION(BAZETT): 457 MS
QTC CALCULATION(BAZETT): 539 MS
QTC FREDERICIA: 423 MS
QTC FREDERICIA: 453 MS
R AXIS: 6 DEGREES
R AXIS: 69 DEGREES
T AXIS: -7 DEGREES
T AXIS: 34 DEGREES
T OFFSET: 356 MS
T OFFSET: 376 MS
VENTRICULAR RATE: 171 BPM
VENTRICULAR RATE: 96 BPM

## 2024-02-01 LAB
ATRIAL RATE: 85 BPM
P AXIS: 60 DEGREES
P OFFSET: 168 MS
P ONSET: 114 MS
PR INTERVAL: 158 MS
Q ONSET: 193 MS
QRS COUNT: 14 BEATS
QRS DURATION: 142 MS
QT INTERVAL: 368 MS
QTC CALCULATION(BAZETT): 437 MS
QTC FREDERICIA: 413 MS
R AXIS: -14 DEGREES
T AXIS: 36 DEGREES
T OFFSET: 377 MS
VENTRICULAR RATE: 85 BPM